# Patient Record
Sex: FEMALE | Race: WHITE | NOT HISPANIC OR LATINO | Employment: STUDENT | URBAN - METROPOLITAN AREA
[De-identification: names, ages, dates, MRNs, and addresses within clinical notes are randomized per-mention and may not be internally consistent; named-entity substitution may affect disease eponyms.]

---

## 2025-06-20 ENCOUNTER — HOSPITAL ENCOUNTER (OUTPATIENT)
Dept: RADIOLOGY | Facility: CLINIC | Age: 20
Discharge: HOME | End: 2025-06-20
Payer: COMMERCIAL

## 2025-06-20 ENCOUNTER — APPOINTMENT (OUTPATIENT)
Dept: OBSTETRICS AND GYNECOLOGY | Facility: CLINIC | Age: 20
End: 2025-06-20
Payer: COMMERCIAL

## 2025-06-20 VITALS
WEIGHT: 119 LBS | DIASTOLIC BLOOD PRESSURE: 68 MMHG | BODY MASS INDEX: 21.9 KG/M2 | SYSTOLIC BLOOD PRESSURE: 102 MMHG | HEIGHT: 62 IN

## 2025-06-20 DIAGNOSIS — N91.1 AMENORRHEA, SECONDARY: ICD-10-CM

## 2025-06-20 DIAGNOSIS — Z11.3 SCREEN FOR STD (SEXUALLY TRANSMITTED DISEASE): ICD-10-CM

## 2025-06-20 DIAGNOSIS — N91.4 SECONDARY OLIGOMENORRHEA: ICD-10-CM

## 2025-06-20 DIAGNOSIS — N91.4 SECONDARY OLIGOMENORRHEA: Primary | ICD-10-CM

## 2025-06-20 PROCEDURE — 99204 OFFICE O/P NEW MOD 45 MIN: CPT | Performed by: OBSTETRICS & GYNECOLOGY

## 2025-06-20 PROCEDURE — 76830 TRANSVAGINAL US NON-OB: CPT | Performed by: OBSTETRICS & GYNECOLOGY

## 2025-06-20 PROCEDURE — 1036F TOBACCO NON-USER: CPT | Performed by: OBSTETRICS & GYNECOLOGY

## 2025-06-20 PROCEDURE — 3008F BODY MASS INDEX DOCD: CPT | Performed by: OBSTETRICS & GYNECOLOGY

## 2025-06-20 ASSESSMENT — PAIN SCALES - GENERAL: PAINLEVEL_OUTOF10: 0-NO PAIN

## 2025-06-20 NOTE — PROGRESS NOTES
Patient complaining of no menstrual cycle since January when she discontinued oral contraceptive she oral contraceptive pill for 3 years for contraception and acne  Prior to starting the pill her cycles were regular  She is currently sexually active and using condoms  She has not had a resurgence in her acne since discontinuing the pill  She is not on any medications on a regular basis and is not taking any new supplements or vitamins  She has not had any significant weight loss or weight gain in the last several months  She goes to [employer]  She does not play sports in college    She denies fatigue, hair loss, constipation or diarrhea, unusual headaches or vision changes    Pe deferred    Pelvic US today suggestive of PCOS, endometrial stripe 2 mm    Long discussion with patient and her mom regarding pelvic ultrasound results and possible causes for secondary oligomenorrhea.  Discussed a suggestion of PCOS and pelvic ultrasound.  Discussed criteria used to diagnose PCOS.  Discussed implications with oligomenorrhea, ovulation, insulin resistance, and abnormal lipids.  Discussed relation with PCOS and overweight and obesity.     A/P: Secondary oligomenorrhea with polycystic appearing ovaries on pelvic ultrasound.  STD screening  -TSH, FSH, LH, estradiol  -Gonorrhea, chlamydia, trichomonas screen  -Discussed with patient and mom cycling with progesterone every 3 months if no spontaneous menstrual cycle versus restarting oral contraceptive pill.  -Notify patient of results and follow-up  -If patient opts to cycle with progesterone every 3 months we will repeat ultrasound and estradiol in 1 year if patient does not have spontaneous menstrual cycles

## 2025-06-21 LAB
C TRACH RRNA SPEC QL NAA+PROBE: NOT DETECTED
ESTRADIOL SERPL-MCNC: 41 PG/ML
FSH SERPL-ACNC: 8.3 MIU/ML
LH SERPL-ACNC: 3.6 MIU/ML
N GONORRHOEA RRNA SPEC QL NAA+PROBE: NOT DETECTED
PROLACTIN SERPL-MCNC: 2.9 NG/ML
QUEST GC CT AMPLIFIED (ALWAYS MESSAGE): NORMAL
T VAGINALIS RRNA SPEC QL NAA+PROBE: NOT DETECTED
TSH SERPL-ACNC: 1.14 MIU/L

## 2025-07-01 DIAGNOSIS — Z30.011 ENCOUNTER FOR INITIAL PRESCRIPTION OF CONTRACEPTIVE PILLS: ICD-10-CM

## 2025-07-01 DIAGNOSIS — N91.4 SECONDARY OLIGOMENORRHEA: Primary | ICD-10-CM

## 2025-07-01 RX ORDER — DROSPIRENONE AND ETHINYL ESTRADIOL 0.02-3(28)
1 KIT ORAL DAILY
Qty: 84 TABLET | Refills: 3 | Status: SHIPPED | OUTPATIENT
Start: 2025-07-01 | End: 2026-07-01